# Patient Record
Sex: FEMALE | Race: WHITE | Employment: FULL TIME | URBAN - METROPOLITAN AREA
[De-identification: names, ages, dates, MRNs, and addresses within clinical notes are randomized per-mention and may not be internally consistent; named-entity substitution may affect disease eponyms.]

---

## 2017-01-24 DIAGNOSIS — I60.02: Primary | ICD-10-CM

## 2017-01-27 ENCOUNTER — HOSPITAL ENCOUNTER (OUTPATIENT)
Dept: MRI IMAGING | Age: 62
Discharge: HOME | End: 2017-01-27
Attending: NURSE PRACTITIONER
Payer: COMMERCIAL

## 2017-01-27 VITALS — BODY MASS INDEX: 22.47 KG/M2 | WEIGHT: 135 LBS

## 2017-01-27 DIAGNOSIS — I60.02: ICD-10-CM

## 2017-01-27 PROCEDURE — A9585 GADOBUTROL INJECTION: HCPCS | Performed by: NURSE PRACTITIONER

## 2017-01-27 PROCEDURE — 74011250636 HC RX REV CODE- 250/636: Performed by: NURSE PRACTITIONER

## 2017-01-27 PROCEDURE — 74011000258 HC RX REV CODE- 258: Performed by: NURSE PRACTITIONER

## 2017-01-27 PROCEDURE — 70546 MR ANGIOGRAPH HEAD W/O&W/DYE: CPT

## 2017-01-27 RX ORDER — SODIUM CHLORIDE 0.9 % (FLUSH) 0.9 %
10 SYRINGE (ML) INJECTION
Status: COMPLETED | OUTPATIENT
Start: 2017-01-27 | End: 2017-01-27

## 2017-01-27 RX ADMIN — Medication 10 ML: at 19:41

## 2017-01-27 RX ADMIN — SODIUM CHLORIDE 100 ML: 900 INJECTION, SOLUTION INTRAVENOUS at 19:41

## 2017-01-27 RX ADMIN — GADOBUTROL 6 ML: 604.72 INJECTION INTRAVENOUS at 19:41

## 2017-01-31 ENCOUNTER — OFFICE VISIT (OUTPATIENT)
Dept: NEUROSURGERY | Age: 62
End: 2017-01-31

## 2017-01-31 VITALS
RESPIRATION RATE: 18 BRPM | DIASTOLIC BLOOD PRESSURE: 82 MMHG | SYSTOLIC BLOOD PRESSURE: 129 MMHG | HEART RATE: 82 BPM | TEMPERATURE: 98.3 F

## 2017-01-31 DIAGNOSIS — I60.02: Primary | ICD-10-CM

## 2017-01-31 NOTE — MR AVS SNAPSHOT
Visit Information Date & Time Provider Department Dept. Phone Encounter #  
 1/31/2017 10:30 AM Hudson No MD [facility] Neurointerventional Surgery [phone] [id number] Follow-up Instructions Return in about 1 year (around 1/31/2018), or MRA and clinic visit. Upcoming Health Maintenance Date Due Hepatitis C Screening 1955 DTaP/Tdap/Td series (1 - Tdap) 2/24/1977 PAP AKA CERVICAL CYTOLOGY 2/24/1977 FOBT Q 1 YEAR AGE 50-75 2/24/2006 ZOSTER VACCINE AGE 60> 2/24/2016 INFLUENZA AGE 9 TO ADULT 8/1/2016 BREAST CANCER SCRN MAMMOGRAM 7/6/2017 Allergies as of 1/31/2017  Review Complete On: 1/31/2017 By: Rona Hallman No Known Allergies Current Immunizations  Reviewed on 6/21/2014 No immunizations on file. Not reviewed this visit You Were Diagnosed With   
  
 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 
ICD-9-CM: 430 Vitals BP Pulse Temp Resp OB Status Smoking Status 129/82 (BP 1 Location: Left arm, BP Patient Position: Sitting) 82 98.3 °F (36.8 °C) (Oral) 18 Postmenopausal Never Smoker Your Updated Medication List  
  
   
This list is accurate as of: 1/31/17 10:49 AM.  Always use your most recent med list.  
  
  
  
  
 acetaminophen 325 mg tablet Commonly known as:  TYLENOL Take 2 Tabs by mouth every four (4) hours as needed. aspirin 325 mg tablet Commonly known as:  ASPIRIN Take 1 Tab by mouth daily. clopidogrel 75 mg Tab Commonly known as:  PLAVIX Take 2 Tabs by mouth daily. clotrimazole-betamethasone topical cream  
Commonly known as:  Grimaldo Bart Apply to affected area at neck  
  
 famotidine 40 mg/5 mL (8 mg/mL) suspension Commonly known as:  PEPCID Take 2.5 mL by mouth two (2) times a day. levETIRAcetam 100 mg/ml Soln oral solution Commonly known as:  KEPPRA Take 5 mL by mouth two (2) times a day. therapeutic multivitamin tablet Commonly known as:  [facility] Take 1 Tab by mouth daily. traZODone 50 mg tablet Commonly known as:  Maicol Lucas Take 1 Tab by mouth nightly as needed for Sleep. VITAMIN B-12 1,000 mcg tablet Generic drug:  cyanocobalamin Take 1,000 mcg by mouth daily. VITAMIN D3 5,000 unit Tab tablet Generic drug:  cholecalciferol (VITAMIN D3) Take  by mouth daily. Follow-up Instructions Return in about 1 year (around 1/31/2018), or MRA and clinic visit. To-Do List   
 12/01/2017 Imaging:  MRA BRAIN W CONT Referral Information Referral ID Referred By Referred To  
  
 [id number] Luis E HERNADEZ Not Available Visits Status Start Date End Date 1 New Request 1/31/17 1/31/18 If your referral has a status of pending review or denied, additional information will be sent to support the outcome of this decision. Introducing [facility]! Dear Garland Means: Thank you for requesting a Appature account. Our records indicate that you already have an active Appature account. You can access your account anytime at [email or web address] Did you know that you can access your hospital and ER discharge instructions at any time in Appature? You can also review all of your test results from your hospital stay or ER visit. Additional Information If you have questions, please visit the Frequently Asked Questions section of the Appature website at [email or web address] Remember, Appature is NOT to be used for urgent needs. For medical emergencies, dial 911. Now available from your iPhone and Android! Please provide this summary of care documentation to your next provider. Your primary care clinician is listed as Nica Huang. If you have any questions after today's visit, please call [phone].

## 2017-01-31 NOTE — PROGRESS NOTES
Neurointerventional Surgery  Ambulatory Progress Note      Patient: Lauren Mason MRN: [id number]  SSN: [id number]    YOB: 1955  Age: 62 y.o. Sex: female      History of Present Illness:       63 yo female returns to clinic today as routine follow up for [facility] of her LICA blister 6/2966. Pt has had remarkable recovery, is resumed all activities including marathon participation. She occassionally gets a mild HA. No further neurological complaints. She is here today to review her MRA head imaging that she completed last week. 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 Objective:     Current Outpatient Prescriptions   Medication Sig Dispense Refill    cyanocobalamin (VITAMIN B-12) 1,000 mcg tablet Take 1,000 mcg by mouth daily.  cholecalciferol, VITAMIN D3, (VITAMIN D3) 5,000 unit tab tablet Take  by mouth daily.  aspirin (ASPIRIN) 325 mg tablet Take 1 Tab by mouth daily. 90 Tab 0    clotrimazole-betamethasone (LOTRISONE) topical cream Apply to affected area at neck 15 g 0    therapeutic multivitamin (THERAGRAN) tablet Take 1 Tab by mouth daily.  acetaminophen (TYLENOL) 325 mg tablet Take 2 Tabs by mouth every four (4) hours as needed. 90 Tab 0    clopidogrel (PLAVIX) 75 mg tablet Take 2 Tabs by mouth daily. 30 Tab 0    famotidine (PEPCID) 40 mg/5 mL suspension Take 2.5 mL by mouth two (2) times a day. 50 mL 0    traZODone (DESYREL) 50 mg tablet Take 1 Tab by mouth nightly as needed for Sleep. 90 Tab 0    levetiracetam (KEPPRA) 100 mg/ml soln oral solution Take 5 mL by mouth two (2) times a day.  1 Bottle 0        Visit Vitals    /82 (BP 1 Location: Left arm, BP Patient Position: Sitting)    Pulse 82    Temp 98.3 °F (36.8 °C) (Oral)    Resp 18       No Known Allergies     Neurologic Exam:  Mental Status:  Alert and oriented x 4. Appropriate affect, mood and behavior. Language:    Normal fluency, repetition, comprehension and naming. Cranial Nerves:   Pupils equal, round and reactive to light. Visual fields full to confrontation. Extraocular movements intact. Facial sensation intact V1 - V3. Full facial strength, no asymmetry. Hearing intact bilaterally. No dysarthria. Tongue protrudes to midline, palate elevates symmetrically. Shoulder shrug 5/5 bilaterally. Motor:    No pronator drift. Bulk and tone normal.      5/5 power in all extremities proximally and distally. No involuntary movements. Sensation:    Sensation intact throughout to light touch. Coordination & Gait: Normal.      Labs:  Lab Results   Component Value Date/Time    Sodium 147 11/25/2014 02:47 PM    Potassium 4.3 11/25/2014 02:47 PM    Chloride 103 11/25/2014 02:47 PM    CO2 31 11/25/2014 02:47 PM    Anion gap 9 06/24/2014 02:10 AM    Glucose 73 11/25/2014 02:47 PM    BUN 17 11/25/2014 02:47 PM    Creatinine 0.75 11/25/2014 02:47 PM    BUN/Creatinine ratio 23 11/25/2014 02:47 PM    GFR est  11/25/2014 02:47 PM    GFR est non-AA 88 11/25/2014 02:47 PM    Calcium 9.6 11/25/2014 02:47 PM     Lab Results   Component Value Date/Time    WBC 4.9 11/25/2014 02:47 PM    HGB 13.8 11/25/2014 02:47 PM    HCT 40.4 11/25/2014 02:47 PM    PLATELET 166 71/55/7075 02:47 PM    MCV 91 11/25/2014 02:47 PM     Lab Results   Component Value Date/Time    MCH 31.2 11/25/2014 02:47 PM    MCHC 34.2 11/25/2014 02:47 PM    BASOPHILS 0 11/25/2014 02:47 PM    ABS. LYMPHOCYTES 1.1 06/23/2014 03:29 AM    ABS. MONOCYTES 0.3 11/25/2014 02:47 PM    ABS. EOSINOPHILS 0.1 11/25/2014 02:47 PM    ABS.  BASOPHILS 0.0 11/25/2014 02:47 PM    Phosphorus 4.2 06/23/2014 03:29 AM    Magnesium 1.7 06/23/2014 03:29 AM       IMAGING:    MRA head 1/27/2017:    IMPRESSION: Negative contrast enhanced MRA of the [place].        Assessment/Plan:       ICD-10-CM ICD-9-CM    1. 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         Brittni Morton, NP

## 2017-01-31 NOTE — LETTER
1/31/2017 Patient:  Jessica Alfonso YOB: 1955 Date of Visit: 1/31/2017 Dear Cb Pierson MD 
[address] [facility] [address] [id number] VIA Facsimile: [phone] 
 : 
 
I have has the pleasure of seeing Jessica Alfonso today 1/31/2017, who as you know is a 62y.o. year old female with a  Previously treated blister aneurysm. I have reviewed the patient's history, medical records, and diagnostic imaging. I have examined the patient and my pertinent findings are in my nurses note. In brief, the patient has a history of an internal carotid artery Blister aneurysm. This aneurysm was successfully treated with a pipeline stent. The patient has done very well. We continue to follow her to determine the patency of the pipeline stent. On todays imaging there is no evidence for stenosis of the stent, but we plan to follow it on a yearly basis. I have had a long conversation with the patient and her family The patient voices understanding and would like to proceed. We will schedule the patient as needed and keep you apprised as to her progress. Thank you for referring this pleasant patient to us. If you have any questions please feel free to email me at [email or web address], or call me in my office at [phone] or my cell [phone]. Roxann Solis MD 
Professor of Radiology, Neurology and Neurological Surgery [facility] [address]  [address] [facility] [employer]

## 2019-02-07 ENCOUNTER — DOCUMENTATION ONLY (OUTPATIENT)
Dept: NEUROSURGERY | Age: 64
End: 2019-02-07

## 2019-02-07 DIAGNOSIS — I60.9 SAH (SUBARACHNOID HEMORRHAGE) (HCC): Primary | ICD-10-CM
